# Patient Record
Sex: FEMALE | Race: WHITE
[De-identification: names, ages, dates, MRNs, and addresses within clinical notes are randomized per-mention and may not be internally consistent; named-entity substitution may affect disease eponyms.]

---

## 2017-02-23 ENCOUNTER — HOSPITAL ENCOUNTER (OUTPATIENT)
Dept: HOSPITAL 17 - HRAD | Age: 72
Discharge: HOME | End: 2017-02-23
Attending: FAMILY MEDICINE
Payer: MEDICARE

## 2017-02-23 VITALS
SYSTOLIC BLOOD PRESSURE: 147 MMHG | TEMPERATURE: 98.1 F | OXYGEN SATURATION: 96 % | HEART RATE: 69 BPM | DIASTOLIC BLOOD PRESSURE: 75 MMHG | RESPIRATION RATE: 18 BRPM

## 2017-02-23 VITALS
SYSTOLIC BLOOD PRESSURE: 146 MMHG | RESPIRATION RATE: 16 BRPM | TEMPERATURE: 97.6 F | HEART RATE: 50 BPM | OXYGEN SATURATION: 100 % | DIASTOLIC BLOOD PRESSURE: 69 MMHG

## 2017-02-23 VITALS
HEART RATE: 68 BPM | RESPIRATION RATE: 18 BRPM | DIASTOLIC BLOOD PRESSURE: 70 MMHG | OXYGEN SATURATION: 19 % | SYSTOLIC BLOOD PRESSURE: 141 MMHG

## 2017-02-23 DIAGNOSIS — E03.9: ICD-10-CM

## 2017-02-23 DIAGNOSIS — M19.90: ICD-10-CM

## 2017-02-23 DIAGNOSIS — I10: ICD-10-CM

## 2017-02-23 DIAGNOSIS — E04.1: Primary | ICD-10-CM

## 2017-02-23 DIAGNOSIS — E11.9: ICD-10-CM

## 2017-02-23 PROCEDURE — 76942 ECHO GUIDE FOR BIOPSY: CPT

## 2017-02-23 PROCEDURE — 88173 CYTOPATH EVAL FNA REPORT: CPT

## 2017-02-23 PROCEDURE — 88172 CYTP DX EVAL FNA 1ST EA SITE: CPT

## 2017-02-23 PROCEDURE — 10022: CPT

## 2017-02-23 NOTE — RADRPT
EXAM DATE/TIME:  02/23/2017 13:35 

 

[facility] COMPARISON:     

No previous studies available for comparison.

 

EXTERNAL COMPARISON: 

[facility], US THYROID, Jan 31 2017.

 

 

INDICATIONS :      

Right thyroid mass.

       

 

 

MEDICAL HISTORY :     

Hypothyroidism. Hypertension. Arthritis. Diabetes. 

 

SURGICAL HISTORY :     

Hysterectomy.   Left knee arthroscopy. 

 

ENCOUNTER:     

Initial

 

ACUITY:     

1 day

 

PAIN SCORE:     

0/10

 

LOCATION:     

Right neck

                     

 

ORGAN:      

Right thyroid lobe 

 

SPECIMENS:      

Four fine needle aspirate(s) submitted for pathologic evaluation.

 

DEVICE:      

22 gauge needle

                     

Post procedure scanning reveals no hematoma or other complication.

 

The possibility does exist that the tissue obtained will be non-diagnostic.  If the sample is non-velia
gnostic a repeat

biopsy or surgical biopsy may need to be performed.  

 

TECHNIQUE:  

1.  Ultrasound guidance for needle biopsy.

2.  Needle biopsy.

 

The risks, benefits, and alternatives to ultrasound guided needle biopsy were explained to the patien
t in detail including the risk of bleeding and infection.  Written and verbal informed consent was ob
tained. 

 

With the patient on the ultrasound table, images were obtained.  Images document a solid and cystic n
odule occupying most of the right lobe of the thyroid gland measuring 5.2 x 3.0 x 3.9 cm. It contains
 no calcification. Overlying skin was prepped and draped in the usual sterile fashion and Lidocaine w
as utilized as a local anesthetic. 

 

A needle was advanced into the identified target and the number of specimens as above obtained and dunn
bmitted for pathologic evaluation.

 

The patient tolerated the procedure well and left the ultrasound suite in stable condition. 

 

CONCLUSION:     

Uncomplicated ultrasound guided needle biopsy of the right lobe thyroid nodule.  

 

 

 

 Junior Ornelas MD on February 23, 2017 at 15:50           

Board Certified Radiologist.

 This report was verified electronically.

## 2018-04-16 ENCOUNTER — HOSPITAL ENCOUNTER (EMERGENCY)
Dept: HOSPITAL 17 - PHED | Age: 73
Discharge: HOME | End: 2018-04-16
Payer: MEDICARE

## 2018-04-16 VITALS — SYSTOLIC BLOOD PRESSURE: 179 MMHG | DIASTOLIC BLOOD PRESSURE: 108 MMHG | TEMPERATURE: 97.6 F | OXYGEN SATURATION: 100 %

## 2018-04-16 VITALS — BODY MASS INDEX: 30.07 KG/M2 | WEIGHT: 198.42 LBS | HEIGHT: 68 IN

## 2018-04-16 VITALS
HEART RATE: 57 BPM | DIASTOLIC BLOOD PRESSURE: 80 MMHG | SYSTOLIC BLOOD PRESSURE: 180 MMHG | RESPIRATION RATE: 16 BRPM | OXYGEN SATURATION: 98 %

## 2018-04-16 DIAGNOSIS — Z87.891: ICD-10-CM

## 2018-04-16 DIAGNOSIS — M19.90: ICD-10-CM

## 2018-04-16 DIAGNOSIS — I10: ICD-10-CM

## 2018-04-16 DIAGNOSIS — E03.9: ICD-10-CM

## 2018-04-16 DIAGNOSIS — M79.602: Primary | ICD-10-CM

## 2018-04-16 DIAGNOSIS — E11.9: ICD-10-CM

## 2018-04-16 PROCEDURE — 93005 ELECTROCARDIOGRAM TRACING: CPT

## 2018-04-16 PROCEDURE — 99281 EMR DPT VST MAYX REQ PHY/QHP: CPT

## 2018-04-16 NOTE — PD
HPI


Chief Complaint:  Pain: Acute or Chronic


Time Seen by Provider:  10:18


Travel History


International Travel<30 days:  No


Contact w/Intl Traveler<30days:  No


Traveled to known affect area:  No





History of Present Illness


HPI


9-year-old female woke up this morning feeling okay.  Soon after she had some 

pain in her left arm.  She had no associated shortness of breath or 

diaphoresis.  There is no chest pain.  He felt like the arm was having pain 

above and below the elbow and at the site on the wrist.  She has been doing a 

lot of home improvements she does have some arthritis but this pain seems a 

little bit different.  Heart attack.  She has no known history of heart 

disease.  She does have a history of diabetes for 30 years





PFS


Past Medical History


Arthritis:  Yes


Diabetes:  Yes


Patient Takes Glucophage:  Yes


Diminished Hearing:  No


Hypertension:  Yes


Immunizations Current:  Yes


Thyroid Disease:  Yes (HYPOTHYROID)


Influenza Vaccination:  Yes


Pregnant?:  Not Pregnant


Menopausal:  Yes





Past Surgical History


Hysterectomy:  Yes





Social History


Alcohol Use:  Yes (once every 6 months)


Tobacco Use:  No (QUIT IN 1995 SMOKED SOCIALLY ONLY CIGS)


Substance Use:  No





Allergies-Medications


(Allergen,Severity, Reaction):  


Coded Allergies:  


     lidocaine (Unverified  Allergy, Severe, ITCHING, 4/16/18)


Reported Meds & Prescriptions





Reported Meds & Active Scripts


Active


Reported


Amlodipine (Amlodipine Besylate) 5 Mg Tab 5 Mg PO DAILY


Novolog Mix 70-30 Inj (Insulin Aspart Prota 70%/Aspart 30%) 1,000 Unit/10 Ml 

Vial 28 Units SQ BID


Tizanidine (Tizanidine HCl) 4 Mg Cap 4 Mg PO TID


Alprazolam 0.25 Mg Tab 0.25 Mg PO DAILY


Trazodone (Trazodone HCl) 50 Mg Tab 50 Mg PO HS


Metformin (Metformin HCl) 1,000 Mg Tab 1,000 Mg PO BIDPC


Meloxicam 7.5 Mg Tab 7.5 Mg PO DAILY


Tramadol (Tramadol HCl) 50 Mg Tab 50 Mg PO BID


Levothyroxine (Levothyroxine Sodium) 50 Mcg Tab 50 Mcg PO DAILY


Benazepril (Benazepril HCl) 40 Mg Tab 40 Mg PO DAILY








Review of Systems


General / Constitutional:  No: Fever, Chills


Eyes:  No: Diploplia, Blurred Vision


HENT:  No: Headaches, Vertigo


Cardiovascular:  No: Chest Pain or Discomfort, Palpitations


Respiratory:  No: Cough, Shortness of Breath


Gastrointestinal:  No: Nausea, Vomiting


Genitourinary:  No: Urgency


Musculoskeletal:  Positive: Myalgias, Arthralgias


Skin:  No Rash


Neurologic:  No: Weakness


Psychiatric:  No: Anxiety





Physical Exam


Narrative


GENERAL: Well-developed female


SKIN: Focused skin assessment warm/dry.


HEAD: Atraumatic. Normocephalic. 


EYES: Pupils equal and round. No scleral icterus. No injection or drainage. 


ENT: No nasal bleeding or discharge.  Mucous membranes pink and moist.


NECK: Trachea midline. No JVD. 


CARDIOVASCULAR: Regular rate and rhythm.  No murmur appreciated.


RESPIRATORY: No accessory muscle use. Clear to auscultation. Breath sounds 

equal bilaterally. 


GASTROINTESTINAL: Abdomen soft, non-tender, nondistended. Hepatic and splenic 

margins not palpable. 


MUSCULOSKELETAL: No obvious deformities. No clubbing.  No cyanosis.  No edema.  

Termination of the left arm does not show any focal tenderness there is full 

range of motion of the joints.  The  strength appears equal she says the 

sensation seems a little less on the left


NEUROLOGICAL: Awake and alert. No obvious cranial nerve deficits.  Motor 

grossly within normal limits. Normal speech.


PSYCHIATRIC: Appropriate mood and affect; insight and judgment normal.





Data


Data


Last Documented VS





Vital Signs








  Date Time  Temp Pulse Resp B/P (MAP) Pulse Ox O2 Delivery O2 Flow Rate FiO2


 


4/16/18 10:23 97.6 70 18 179/108 (131) 100   











MDM


Medical Decision Making


Medical Screen Exam Complete:  Yes


Emergency Medical Condition:  Yes


Medical Record Reviewed:  Yes


Differential Diagnosis


Differential includes myocardial ischemia, arthritis, bursitis


Narrative Course


Patient was concerned that she might be having a heart attack.  Her EKG shows a 

normal sinus rhythm.  This is isolated arm pain without diaphoresis shortness 

of breath or any other symptoms suggestive of myocardial infarction.  It does 

seem well localized and I believe secondary to arthritic type pain.





Diagnosis





 Primary Impression:  


 Left arm pain





***Additional Instructions:  


Take tramadol as needed, return as needed


Disposition:  01 DISCHARGE HOME


Condition:  Stable











Edd Lopez MD Apr 16, 2018 10:30

## 2018-04-17 NOTE — EKG
Date Performed: 04/16/2018       Time Performed: 10:19:45

 

PTAGE:      72 years

 

EKG:      Sinus rhythm 

 

 NORMAL ECG 

 

NO PREVIOUS TRACING            

 

DOCTOR:   Dimitrios Saleh  Interpretating Date/Time  04/17/2018 15:21:49